# Patient Record
Sex: FEMALE | Race: WHITE | NOT HISPANIC OR LATINO | Employment: FULL TIME | URBAN - METROPOLITAN AREA
[De-identification: names, ages, dates, MRNs, and addresses within clinical notes are randomized per-mention and may not be internally consistent; named-entity substitution may affect disease eponyms.]

---

## 2024-08-08 ENCOUNTER — OFFICE VISIT (OUTPATIENT)
Age: 19
End: 2024-08-08
Payer: COMMERCIAL

## 2024-08-08 VITALS
RESPIRATION RATE: 20 BRPM | WEIGHT: 142 LBS | TEMPERATURE: 98.2 F | OXYGEN SATURATION: 98 % | HEIGHT: 63 IN | BODY MASS INDEX: 25.16 KG/M2 | HEART RATE: 105 BPM | DIASTOLIC BLOOD PRESSURE: 68 MMHG | SYSTOLIC BLOOD PRESSURE: 120 MMHG

## 2024-08-08 DIAGNOSIS — M72.2 PLANTAR FASCIITIS: ICD-10-CM

## 2024-08-08 DIAGNOSIS — J06.9 VIRAL URI WITH COUGH: ICD-10-CM

## 2024-08-08 DIAGNOSIS — Z00.00 HEALTHCARE MAINTENANCE: ICD-10-CM

## 2024-08-08 DIAGNOSIS — F41.9 ANXIETY: ICD-10-CM

## 2024-08-08 DIAGNOSIS — R05.9 COUGH, UNSPECIFIED TYPE: Primary | ICD-10-CM

## 2024-08-08 LAB
EXPIRATION DATE: NORMAL
FLUAV AG UPPER RESP QL IA.RAPID: NOT DETECTED
FLUBV AG UPPER RESP QL IA.RAPID: NOT DETECTED
INTERNAL CONTROL: NORMAL
Lab: NORMAL
SARS-COV-2 AG UPPER RESP QL IA.RAPID: NOT DETECTED

## 2024-08-08 PROCEDURE — 99204 OFFICE O/P NEW MOD 45 MIN: CPT | Performed by: STUDENT IN AN ORGANIZED HEALTH CARE EDUCATION/TRAINING PROGRAM

## 2024-08-08 PROCEDURE — 87428 SARSCOV & INF VIR A&B AG IA: CPT | Performed by: STUDENT IN AN ORGANIZED HEALTH CARE EDUCATION/TRAINING PROGRAM

## 2024-08-08 RX ORDER — BENZONATATE 100 MG/1
100 CAPSULE ORAL 3 TIMES DAILY PRN
Qty: 30 CAPSULE | Refills: 1 | Status: SHIPPED | OUTPATIENT
Start: 2024-08-08

## 2024-08-08 RX ORDER — SERTRALINE HYDROCHLORIDE 25 MG/1
25 TABLET, FILM COATED ORAL DAILY
Qty: 30 TABLET | Refills: 5 | Status: SHIPPED | OUTPATIENT
Start: 2024-08-08

## 2024-08-08 RX ORDER — NORELGESTROMIN AND ETHINYL ESTRADIOL 35; 150 UG/D; UG/D
1 PATCH TRANSDERMAL WEEKLY
COMMUNITY

## 2024-08-08 RX ORDER — PROCHLORPERAZINE MALEATE 10 MG
10 TABLET ORAL EVERY 6 HOURS PRN
COMMUNITY
Start: 2024-06-13

## 2024-08-08 RX ORDER — MELOXICAM 7.5 MG/1
7.5 TABLET ORAL DAILY
Qty: 30 TABLET | Refills: 0 | Status: SHIPPED | OUTPATIENT
Start: 2024-08-08

## 2024-08-09 NOTE — PROGRESS NOTES
Office Note     Name: Lashay Hazel    : 2005     MRN: [id number]     Chief Complaint  Establish Care, Foot Pain (L x2-3 days, started when walking in [place]/Reports arch in foot is collapsing), Anxiety (Skin picking), and Cough (No sense of smell & taste/Nasal drainage & congestion/Body aches)    Subjective     History of Present Illness:  Lashay Hazel is a 19 y.o. female who presents today for initial visit to establish care.  Her primary concerns are anxiety, foot pain, acute respiratory illness with congestion and bodyaches.    Past Medical History:   Past Medical History:   Diagnosis Date   • Ovarian cyst 2023    Per CT scan at [facility]       Past Surgical History: History reviewed. No pertinent surgical history.    Immunizations:   There is no immunization history on file for this patient.     Medications:     Current Outpatient Medications:   •  Zafemy 150-35 MCG/24HR, Place 1 patch on the skin as directed by provider 1 (One) Time Per Week. 1 patch once weekly for 3 weeks, then 1 week off. Repeat on same day every week, Disp: , Rfl:   •  benzonatate (Tessalon Perles) 100 MG capsule, Take 1 capsule by mouth 3 (Three) Times a Day As Needed for Cough., Disp: 30 capsule, Rfl: 1  •  meloxicam (MOBIC) 7.5 MG tablet, Take 1 tablet by mouth Daily., Disp: 30 tablet, Rfl: 0  •  prochlorperazine (COMPAZINE) 10 MG tablet, Take 1 tablet by mouth Every 6 (Six) Hours As Needed. (Patient not taking: Reported on 2024), Disp: , Rfl:   •  sertraline (ZOLOFT) 25 MG tablet, Take 1 tablet by mouth Daily., Disp: 30 tablet, Rfl: 5    Allergies:   No Known Allergies    Family History:   Family History   Problem Relation Age of Onset   • Hypertension Father    • Hypertension Maternal Grandmother    • Hypertension Maternal Grandfather    • Hypertension Paternal Grandmother    • Hypertension Paternal Grandfather        Social History:   Social History     Socioeconomic History   • Marital status: Single  "  Tobacco Use   • Smoking status: Never     Passive exposure: Current   • Smokeless tobacco: Never   Vaping Use   • Vaping status: Never Used   • Passive vaping exposure: Yes   Substance and Sexual Activity   • Alcohol use: Never   • Drug use: Never   • Sexual activity: Yes     Partners: Male     Birth control/protection: Patch         Objective     Vital Signs  /68 (BP Location: Right arm, Patient Position: Sitting, Cuff Size: Adult)   Pulse 105   Temp 98.2 °F (36.8 °C) (Infrared)   Resp 20   Ht 160 cm (62.99\")   Wt 64.4 kg (142 lb)   SpO2 98%   BMI 25.16 kg/m²   Estimated body mass index is 25.16 kg/m² as calculated from the following:    Height as of this encounter: 160 cm (62.99\").    Weight as of this encounter: 64.4 kg (142 lb).    BMI is >= 25 and <30. (Overweight) The following options were offered after discussion;: exercise counseling/recommendations      Physical Exam  Constitutional:       General: She is not in acute distress.     Appearance: She is not toxic-appearing.   Cardiovascular:      Rate and Rhythm: Normal rate and regular rhythm.      Heart sounds: No murmur heard.     No friction rub. No gallop.   Pulmonary:      Effort: Pulmonary effort is normal.      Breath sounds: Normal breath sounds.   Abdominal:      General: Abdomen is flat. There is no distension.   Skin:     General: Skin is warm and dry.   Neurological:      Mental Status: She is alert.   Psychiatric:         Mood and Affect: Mood normal.         Behavior: Behavior normal.          Assessment and Plan     1. Cough, unspecified type  COVID and flu negative  - POCT SARS-CoV-2 Antigen MAGALY + Flu    2. Plantar fasciitis  Chronic, uncontrolled  -Flared up from walking while in [place]  -Prescribed meloxicam, counseled on getting foot inserts and home massage and stretching (frozen water bottle or golf ball)  - meloxicam (MOBIC) 7.5 MG tablet; Take 1 tablet by mouth Daily.  Dispense: 30 tablet; Refill: 0    3. " Anxiety  Chronic, uncontrolled  -With skin picking  -Prescribed low-dose Zoloft and referred to behavioral health  - sertraline (ZOLOFT) 25 MG tablet; Take 1 tablet by mouth Daily.  Dispense: 30 tablet; Refill: 5  - Ambulatory Referral to Behavioral Health    4. Healthcare maintenance  Will defer vaccinations and screening labs at this time due to acute respiratory illness    5. Viral URI with cough  Suspect this is a viral infection, will treat symptomatically with Tessalon Perles  -Counseled her to message in if symptoms persist for 7 to 10 days and we will send in antibiotics  - benzonatate (Tessalon Perles) 100 MG capsule; Take 1 capsule by mouth 3 (Three) Times a Day As Needed for Cough.  Dispense: 30 capsule; Refill: 1       Counseling was given to patient for the following topics: instructions for management.    Follow Up  Return in about 6 weeks (around 9/19/2024) for Annual physical.    Rahat Kapadia MD  [facility]  [address] [address]  [phone]

## 2024-08-27 RX ORDER — NORELGESTROMIN AND ETHINYL ESTRADIOL 35; 150 UG/MG; UG/MG
1 PATCH TRANSDERMAL WEEKLY
Qty: 3 PATCH | Refills: 11 | Status: SHIPPED | OUTPATIENT
Start: 2024-08-27

## 2024-08-27 NOTE — TELEPHONE ENCOUNTER
Rx Refill Note  Requested Prescriptions     Pending Prescriptions Disp Refills    Zafemy 150-35 MCG/24HR 3 patch      Sig: Place 1 patch on the skin as directed by provider 1 (One) Time Per Week. 1 patch once weekly for 3 weeks, then 1 week off. Repeat on same day every week      Last office visit with prescribing clinician: 8/8/2024   Last telemedicine visit with prescribing clinician: Visit date not found   Next office visit with prescribing clinician: Visit date not found     Evelin Armstrong Rep  08/27/24, 08:23 EDT    This has not been prescribed by Dr. Kapadia

## 2024-09-04 DIAGNOSIS — M72.2 PLANTAR FASCIITIS: ICD-10-CM

## 2024-09-04 NOTE — TELEPHONE ENCOUNTER
Rx Refill Note  Requested Prescriptions     Pending Prescriptions Disp Refills    meloxicam (MOBIC) 7.5 MG tablet [Pharmacy Med Name: MELOXICAM 7.5MG TABLETS] 30 tablet 0     Sig: TAKE 1 TABLET BY MOUTH DAILY      Last office visit with prescribing clinician: 8/8/2024   Last telemedicine visit with prescribing clinician: Visit date not found   Next office visit with prescribing clinician: Visit date not found                         Would you like a call back once the refill request has been completed: [] Yes [] No    If the office needs to give you a call back, can they leave a voicemail: [] Yes [] No    Hakeem Sarabia MA  09/04/24, 09:19 EDT  PATIENT IS SEEING YOU 09/19, WASN'T SURE IF THIS LOOKED LONG TERM OR NOT

## 2024-09-09 RX ORDER — MELOXICAM 7.5 MG/1
7.5 TABLET ORAL DAILY
Qty: 30 TABLET | Refills: 0 | Status: SHIPPED | OUTPATIENT
Start: 2024-09-09

## 2024-10-31 ENCOUNTER — OFFICE VISIT (OUTPATIENT)
Age: 19
End: 2024-10-31
Payer: COMMERCIAL

## 2024-10-31 VITALS
OXYGEN SATURATION: 99 % | SYSTOLIC BLOOD PRESSURE: 124 MMHG | HEIGHT: 63 IN | HEART RATE: 91 BPM | WEIGHT: 142.5 LBS | DIASTOLIC BLOOD PRESSURE: 86 MMHG | BODY MASS INDEX: 25.25 KG/M2

## 2024-10-31 DIAGNOSIS — G43.709 CHRONIC MIGRAINE WITHOUT AURA WITHOUT STATUS MIGRAINOSUS, NOT INTRACTABLE: Primary | ICD-10-CM

## 2024-10-31 PROCEDURE — 99214 OFFICE O/P EST MOD 30 MIN: CPT | Performed by: STUDENT IN AN ORGANIZED HEALTH CARE EDUCATION/TRAINING PROGRAM

## 2024-10-31 RX ORDER — RIMEGEPANT SULFATE 75 MG/75MG
75 TABLET, ORALLY DISINTEGRATING ORAL ONCE AS NEEDED
Qty: 4 TABLET | Refills: 0 | Status: SHIPPED | OUTPATIENT
Start: 2024-10-31

## 2024-10-31 RX ORDER — SUMATRIPTAN 50 MG/1
50 TABLET, FILM COATED ORAL
Qty: 9 TABLET | Refills: 3 | Status: SHIPPED | OUTPATIENT
Start: 2024-10-31

## 2024-10-31 NOTE — PROGRESS NOTES
Office Note     Name: Lashay Hazel    : 2005     MRN: [id number]     Chief Complaint  Migraine (Once or twice a month, increasing/Ibuprofen 800mg, tylenol, aleve)    Subjective     History of Present Illness:  Lashay Hazel is a 19 y.o. female who presents today for follow-up of chronic migraines.  Reports migraines have been uncontrolled lately.  With episodes 2-3 times per month causing her to miss work and multiple other more mild episodes on more than half the days.  Denies any aura, does report photophobia, nausea, throbbing unilateral headache consistent with migraines.  She reports trying another prescription abortive medication believed to be a triptan in her childhood which stopped working.    Past Medical History:   Past Medical History:   Diagnosis Date    Anxiety     Depression     Headache     Ovarian cyst 2023    Per CT scan at [facility]       Past Surgical History: History reviewed. No pertinent surgical history.    Immunizations:   Immunization History   Administered Date(s) Administered    COVID-19 (PFIZER) Purple Cap Monovalent 2021, 09/10/2021    DTaP 5 2004, 2004, 2005, 2005, 2008    Hep A, 2 Dose 2018, 2019    Hep B / HiB 2004, 2005    Hep B, Adolescent or Pediatric 2004    Hib (PRP-OMP) 2004    Hpv9 2018, 2019    IPV 2004, 2004, 2005, 2008    MMR 2005, 2008    Meningococcal B,(Bexsero) 2021    Meningococcal Conjugate 2015, 2021    Meningococcal MCV4P (Menactra) 2021    PEDS-Pneumococcal Conjugate (PCV7) 2004, 2005, 2005    Td (TDVAX) 2005    Tdap 2015    Varicella 2005, 2008        Medications:     Current Outpatient Medications:     norelgestromin-ethinyl estradiol (Zafemy) 150-35 MCG/24HR, Place 1 patch on the skin as directed by provider 1 (One) Time Per Week. 1 patch once weekly for 3  "weeks, then 1 week off. Repeat on same day every week, Disp: 3 patch, Rfl: 11    Rimegepant Sulfate (Nurtec) 75 MG tablet dispersible tablet, Take 1 tablet by mouth 1 (One) Time As Needed (migraine. 1 tab per 24 hrs)., Disp: 4 tablet, Rfl: 0    SUMAtriptan (IMITREX) 50 MG tablet, Take 1 tablet by mouth Every 2 (Two) Hours As Needed for Migraine. Max dose 200 mg per day, Disp: 9 tablet, Rfl: 3    Allergies:   No Known Allergies    Family History:   Family History   Problem Relation Age of Onset    Hypertension Father     Depression Father     Drug abuse Father     Mental illness Father     Hypertension Maternal Grandmother     Anxiety disorder Maternal Grandmother     Arthritis Maternal Grandmother     Depression Maternal Grandmother     Mental illness Maternal Grandmother     Hypertension Maternal Grandfather     Alcohol abuse Maternal Grandfather     Hypertension Paternal Grandmother     Anxiety disorder Paternal Grandmother     Depression Paternal Grandmother     Mental illness Paternal Grandmother     Hypertension Paternal Grandfather     Heart disease Paternal Grandfather     Anxiety disorder Mother     Depression Mother     Mental illness Mother        Social History:   Social History     Socioeconomic History    Marital status:    Tobacco Use    Smoking status: Never     Passive exposure: Current    Smokeless tobacco: Never   Vaping Use    Vaping status: Never Used    Passive vaping exposure: Yes   Substance and Sexual Activity    Alcohol use: Never    Drug use: Never    Sexual activity: Yes     Partners: Male     Birth control/protection: Patch         Objective     Vital Signs  /86 (BP Location: Left arm, Patient Position: Sitting, Cuff Size: Adult)   Pulse 91   Ht 160 cm (62.99\")   Wt 64.6 kg (142 lb 8 oz)   SpO2 99%   BMI 25.25 kg/m²   Estimated body mass index is 25.25 kg/m² as calculated from the following:    Height as of this encounter: 160 cm (62.99\").    Weight as of this encounter: " 64.6 kg (142 lb 8 oz).            Physical Exam  Constitutional:       General: She is not in acute distress.     Appearance: She is not toxic-appearing.   Pulmonary:      Effort: Pulmonary effort is normal. No respiratory distress.   Abdominal:      General: Abdomen is flat. There is no distension.   Skin:     General: Skin is warm and dry.   Neurological:      Mental Status: She is alert.   Psychiatric:         Mood and Affect: Mood normal.         Behavior: Behavior normal.          Assessment and Plan     1. Chronic migraine without aura without status migrainosus, not intractable  Reasonable accommodation paperwork filled out for work, copy provided in chart  -Has failed 1 other prescription abortive, likely a triptan in the past and will try sumatriptan  -Sample of Nurtec also provided in case this does not work  -Counseled on how to take both medications, offered a preventative medication given mild headaches on more days than not in the month, patient declined.  She is not using over-the-counter analgesics at a frequency that should cause medication overuse headache  - SUMAtriptan (IMITREX) 50 MG tablet; Take 1 tablet by mouth Every 2 (Two) Hours As Needed for Migraine. Max dose 200 mg per day  Dispense: 9 tablet; Refill: 3  - Rimegepant Sulfate (Nurtec) 75 MG tablet dispersible tablet; Take 1 tablet by mouth 1 (One) Time As Needed (migraine. 1 tab per 24 hrs).  Dispense: 4 tablet; Refill: 0     A total time of 30 minutes was spent on this encounter on the day of the encounter with 50% or more of that time spent face-to-face on counseling, examination, history taking.    Counseling was given to patient for the following topics: instructions for management.    Follow Up  Return in about 3 months (around 1/31/2025) for Annual physical.    Rahat Kapadia MD  [facility]  [facility] [address] [phone]

## 2024-11-11 ENCOUNTER — PRIOR AUTHORIZATION (OUTPATIENT)
Age: 19
End: 2024-11-11
Payer: COMMERCIAL

## 2024-11-13 NOTE — TELEPHONE ENCOUNTER
romina Yasemin MEDIC (Key: [id number]) - [id number]  Nurtec 75MG dispersible tablets  status: PA RequestCreated: November 13th, 2024Sent: November 13th, 2024

## 2024-11-13 NOTE — TELEPHONE ENCOUNTER
Received letter that the PA has been canceled. Called number on determination. They gave another number to call     [phone]

## 2024-11-14 NOTE — TELEPHONE ENCOUNTER
Denied     Per your health plan's criteria, this drug is covered if you meet the following:  (1) If you have four or more headache days per month, one of the following:  (A) You are currently being treated with one preventive treatment for migraine from the following:  (I) Elavil (amitriptyline) or Effexor (venlafaxine).  (II) Depakote/Depakote ER (divalproex sodium) or Topamax (topiramate).  (III) Atenolol, metoprolol, nadolol, propranolol, or timolol.  (IV) Candesartan.  (V) Lisinopril.  (B) You have failed (after at least a two month trial) or cannot use one of the following preventive  treatments for migraine:  (I) Elavil (amitriptyline) or Effexor (venlafaxine).  (II) Depakote/Depakote ER (divalproex sodium) or Topamax (topiramate).  (III) Atenolol, metoprolol, nadolol, propranolol, or timolol.  (IV) Candesartan.

## 2024-12-07 ENCOUNTER — PATIENT MESSAGE (OUTPATIENT)
Age: 19
End: 2024-12-07
Payer: COMMERCIAL

## 2024-12-09 RX ORDER — KETOCONAZOLE 20 MG/ML
SHAMPOO, SUSPENSION TOPICAL
Qty: 120 ML | Refills: 1 | Status: SHIPPED | OUTPATIENT
Start: 2024-12-09 | End: 2025-01-06

## 2025-02-17 ENCOUNTER — TELEPHONE (OUTPATIENT)
Dept: FAMILY MEDICINE CLINIC | Facility: TELEHEALTH | Age: 20
End: 2025-02-17
Payer: COMMERCIAL

## 2025-02-17 ENCOUNTER — APPOINTMENT (OUTPATIENT)
Facility: HOSPITAL | Age: 20
End: 2025-02-17
Payer: COMMERCIAL

## 2025-02-17 ENCOUNTER — HOSPITAL ENCOUNTER (EMERGENCY)
Facility: HOSPITAL | Age: 20
Discharge: HOME | End: 2025-02-17
Attending: EMERGENCY MEDICINE | Admitting: EMERGENCY MEDICINE
Payer: COMMERCIAL

## 2025-02-17 VITALS
WEIGHT: 140 LBS | BODY MASS INDEX: 24.8 KG/M2 | OXYGEN SATURATION: 100 % | SYSTOLIC BLOOD PRESSURE: 111 MMHG | RESPIRATION RATE: 20 BRPM | HEIGHT: 63 IN | HEART RATE: 93 BPM | TEMPERATURE: 98.4 F | DIASTOLIC BLOOD PRESSURE: 67 MMHG

## 2025-02-17 DIAGNOSIS — R05.1 ACUTE COUGH: ICD-10-CM

## 2025-02-17 DIAGNOSIS — J10.1 INFLUENZA A VIRUS PRESENT: Primary | ICD-10-CM

## 2025-02-17 LAB
ALBUMIN SERPL-MCNC: 4.2 G/DL (ref 3.5–5.2)
ALBUMIN/GLOB SERPL: 1.3 G/DL
ALP SERPL-CCNC: 70 U/L (ref 39–117)
ALT SERPL W P-5'-P-CCNC: 8 U/L (ref 1–33)
ANION GAP SERPL CALCULATED.3IONS-SCNC: 16.7 MMOL/L (ref 5–15)
AST SERPL-CCNC: 20 U/L (ref 1–32)
BASOPHILS # BLD AUTO: 0.02 10*3/MM3 (ref 0–0.2)
BASOPHILS NFR BLD AUTO: 0.3 % (ref 0–1.5)
BILIRUB SERPL-MCNC: 0.3 MG/DL (ref 0–1.2)
BUN SERPL-MCNC: 10 MG/DL (ref 6–20)
BUN/CREAT SERPL: 20 (ref 7–25)
CALCIUM SPEC-SCNC: 9 MG/DL (ref 8.6–10.5)
CHLORIDE SERPL-SCNC: 97 MMOL/L (ref 98–107)
CO2 SERPL-SCNC: 21.3 MMOL/L (ref 22–29)
CREAT SERPL-MCNC: 0.5 MG/DL (ref 0.57–1)
DEPRECATED RDW RBC AUTO: 40.5 FL (ref 37–54)
EGFRCR SERPLBLD CKD-EPI 2021: 137.9 ML/MIN/1.73
EOSINOPHIL # BLD AUTO: 0 10*3/MM3 (ref 0–0.4)
EOSINOPHIL NFR BLD AUTO: 0 % (ref 0.3–6.2)
ERYTHROCYTE [DISTWIDTH] IN BLOOD BY AUTOMATED COUNT: 12.8 % (ref 12.3–15.4)
FLUAV RNA RESP QL NAA+PROBE: DETECTED
FLUBV RNA RESP QL NAA+PROBE: NOT DETECTED
GLOBULIN UR ELPH-MCNC: 3.2 GM/DL
GLUCOSE SERPL-MCNC: 88 MG/DL (ref 65–99)
HCG INTACT+B SERPL-ACNC: <0.2 MIU/ML
HCT VFR BLD AUTO: 37.2 % (ref 34–46.6)
HGB BLD-MCNC: 12.5 G/DL (ref 12–15.9)
IMM GRANULOCYTES # BLD AUTO: 0.01 10*3/MM3 (ref 0–0.05)
IMM GRANULOCYTES NFR BLD AUTO: 0.1 % (ref 0–0.5)
LYMPHOCYTES # BLD AUTO: 1.71 10*3/MM3 (ref 0.7–3.1)
LYMPHOCYTES NFR BLD AUTO: 23.8 % (ref 19.6–45.3)
MCH RBC QN AUTO: 28.6 PG (ref 26.6–33)
MCHC RBC AUTO-ENTMCNC: 33.6 G/DL (ref 31.5–35.7)
MCV RBC AUTO: 85.1 FL (ref 79–97)
MONOCYTES # BLD AUTO: 1.26 10*3/MM3 (ref 0.1–0.9)
MONOCYTES NFR BLD AUTO: 17.6 % (ref 5–12)
NEUTROPHILS NFR BLD AUTO: 4.17 10*3/MM3 (ref 1.7–7)
NEUTROPHILS NFR BLD AUTO: 58.2 % (ref 42.7–76)
PLATELET # BLD AUTO: 198 10*3/MM3 (ref 140–450)
PMV BLD AUTO: 10.9 FL (ref 6–12)
POTASSIUM SERPL-SCNC: 3.8 MMOL/L (ref 3.5–5.2)
PROT SERPL-MCNC: 7.4 G/DL (ref 6–8.5)
RBC # BLD AUTO: 4.37 10*6/MM3 (ref 3.77–5.28)
RSV RNA RESP QL NAA+PROBE: NOT DETECTED
S PYO AG THROAT QL: NEGATIVE
SARS-COV-2 RNA RESP QL NAA+PROBE: NOT DETECTED
SODIUM SERPL-SCNC: 135 MMOL/L (ref 136–145)
WBC NRBC COR # BLD AUTO: 7.17 10*3/MM3 (ref 3.4–10.8)

## 2025-02-17 PROCEDURE — 99283 EMERGENCY DEPT VISIT LOW MDM: CPT

## 2025-02-17 PROCEDURE — 36415 COLL VENOUS BLD VENIPUNCTURE: CPT

## 2025-02-17 PROCEDURE — 25010000002 KETOROLAC TROMETHAMINE PER 15 MG: Performed by: PHYSICIAN ASSISTANT

## 2025-02-17 PROCEDURE — 80053 COMPREHEN METABOLIC PANEL: CPT | Performed by: PHYSICIAN ASSISTANT

## 2025-02-17 PROCEDURE — 87081 CULTURE SCREEN ONLY: CPT | Performed by: PHYSICIAN ASSISTANT

## 2025-02-17 PROCEDURE — 87637 SARSCOV2&INF A&B&RSV AMP PRB: CPT | Performed by: PHYSICIAN ASSISTANT

## 2025-02-17 PROCEDURE — 96374 THER/PROPH/DIAG INJ IV PUSH: CPT

## 2025-02-17 PROCEDURE — 84702 CHORIONIC GONADOTROPIN TEST: CPT | Performed by: PHYSICIAN ASSISTANT

## 2025-02-17 PROCEDURE — 87880 STREP A ASSAY W/OPTIC: CPT | Performed by: PHYSICIAN ASSISTANT

## 2025-02-17 PROCEDURE — 25810000003 LACTATED RINGERS SOLUTION: Performed by: PHYSICIAN ASSISTANT

## 2025-02-17 PROCEDURE — 71045 X-RAY EXAM CHEST 1 VIEW: CPT

## 2025-02-17 PROCEDURE — 85025 COMPLETE CBC W/AUTO DIFF WBC: CPT | Performed by: PHYSICIAN ASSISTANT

## 2025-02-17 RX ORDER — BENZONATATE 100 MG/1
100 CAPSULE ORAL 3 TIMES DAILY PRN
Qty: 20 CAPSULE | Refills: 0 | Status: SHIPPED | OUTPATIENT
Start: 2025-02-17

## 2025-02-17 RX ORDER — KETOROLAC TROMETHAMINE 30 MG/ML
30 INJECTION, SOLUTION INTRAMUSCULAR; INTRAVENOUS ONCE
Status: COMPLETED | OUTPATIENT
Start: 2025-02-17 | End: 2025-02-17

## 2025-02-17 RX ORDER — ONDANSETRON 4 MG/1
4 TABLET, ORALLY DISINTEGRATING ORAL EVERY 4 HOURS
Qty: 20 TABLET | Refills: 0 | Status: SHIPPED | OUTPATIENT
Start: 2025-02-17

## 2025-02-17 RX ORDER — SODIUM CHLORIDE 0.9 % (FLUSH) 0.9 %
10 SYRINGE (ML) INJECTION AS NEEDED
Status: DISCONTINUED | OUTPATIENT
Start: 2025-02-17 | End: 2025-02-17 | Stop reason: HOSPADM

## 2025-02-17 RX ADMIN — SODIUM CHLORIDE, POTASSIUM CHLORIDE, SODIUM LACTATE AND CALCIUM CHLORIDE 1000 ML: 600; 310; 30; 20 INJECTION, SOLUTION INTRAVENOUS at 18:38

## 2025-02-17 RX ADMIN — KETOROLAC TROMETHAMINE 30 MG: 30 INJECTION, SOLUTION INTRAMUSCULAR; INTRAVENOUS at 18:37

## 2025-02-17 NOTE — Clinical Note
[facility] EMERGENCY DEPARTMENT [place]  [address]   [facility] [address]  Phone: [phone]  Fax: [phone]    Lashay Hazle was seen and treated in our emergency department on 2/17/2025.  She may return to work on 02/20/2025.         Thank you for choosing [facility].    Saniya Menjivar PA

## 2025-02-18 NOTE — FSED PROVIDER NOTE
Subjective   History of Present Illness  Patient presents to ER with history of cough, fever, body aches, and nausea.  She states her symptoms began 2 days ago and worsened today.  She has history of migraines and reports she took her sumatriptan for migraine yesterday.  She still complains of headache today.  Denies known sick contacts.  She denies underlying lung or heart disease.  She does not smoke.  She endorses nausea but denies vomiting and diarrhea.  She states she has not been able to eat or drink much due to decreased appetite and states she feels weak.        Review of Systems   Constitutional:  Positive for appetite change, chills, fatigue and fever.   Respiratory:  Positive for cough.    Gastrointestinal:  Positive for nausea.   Musculoskeletal:  Positive for arthralgias and myalgias.   Neurological:  Positive for headaches.   All other systems reviewed and are negative.      Past Medical History:   Diagnosis Date    Anxiety     Depression     Headache     Ovarian cyst 09/13/2023    Per CT scan at [facility]       No Known Allergies    History reviewed. No pertinent surgical history.    Family History   Problem Relation Age of Onset    Hypertension Father     Depression Father     Drug abuse Father     Mental illness Father     Hypertension Maternal Grandmother     Anxiety disorder Maternal Grandmother     Arthritis Maternal Grandmother     Depression Maternal Grandmother     Mental illness Maternal Grandmother     Hypertension Maternal Grandfather     Alcohol abuse Maternal Grandfather     Hypertension Paternal Grandmother     Anxiety disorder Paternal Grandmother     Depression Paternal Grandmother     Mental illness Paternal Grandmother     Hypertension Paternal Grandfather     Heart disease Paternal Grandfather     Anxiety disorder Mother     Depression Mother     Mental illness Mother        Social History     Socioeconomic History    Marital status:    Tobacco Use    Smoking status: Never      Passive exposure: Current    Smokeless tobacco: Never   Vaping Use    Vaping status: Never Used    Passive vaping exposure: Yes   Substance and Sexual Activity    Alcohol use: Never    Drug use: Never    Sexual activity: Yes     Partners: Male     Birth control/protection: Patch           Objective   Physical Exam  Vitals and nursing note reviewed.   Constitutional:       Appearance: Normal appearance. She is normal weight. She is ill-appearing.   HENT:      Head: Normocephalic and atraumatic.      Right Ear: Tympanic membrane and external ear normal.      Left Ear: Tympanic membrane and external ear normal.      Nose: Nose normal.      Mouth/Throat:      Mouth: Mucous membranes are moist.      Pharynx: Oropharynx is clear. Posterior oropharyngeal erythema present.   Eyes:      Extraocular Movements: Extraocular movements intact.      Pupils: Pupils are equal, round, and reactive to light.   Cardiovascular:      Rate and Rhythm: Regular rhythm. Tachycardia present.      Pulses: Normal pulses.   Pulmonary:      Effort: Pulmonary effort is normal. No respiratory distress.      Breath sounds: Normal breath sounds. No stridor. No wheezing.   Abdominal:      General: Abdomen is flat. Bowel sounds are normal.      Palpations: There is no mass.      Tenderness: There is no abdominal tenderness. There is no guarding or rebound. Negative signs include Rodriguez's sign, Rovsing's sign, McBurney's sign and psoas sign.      Hernia: No hernia is present.   Musculoskeletal:         General: Normal range of motion.      Cervical back: Normal range of motion.   Skin:     General: Skin is warm and dry.      Capillary Refill: Capillary refill takes less than 2 seconds.      Coloration: Skin is not jaundiced or pale.      Findings: No erythema.   Neurological:      General: No focal deficit present.      Mental Status: She is alert.   Psychiatric:         Mood and Affect: Mood normal.         Behavior: Behavior normal.          Procedures           ED Course                                           Medical Decision Making  Differential diagnosis includes influenza, viral syndrome, pneumonia, migraine, bacterial infection    Problems Addressed:  Acute cough: complicated acute illness or injury  Influenza A virus present: complicated acute illness or injury    Amount and/or Complexity of Data Reviewed  Labs: ordered.  Radiology: ordered.    Risk  Prescription drug management.        Final diagnoses:   Influenza A virus present   Acute cough       ED Disposition  ED Disposition       ED Disposition   Discharge    Condition   Stable    Comment   --               Alec Kapadia MD  [address]  [facility] [address]  [place] [id number] [phone]      As needed         Medication List        New Prescriptions      benzonatate 100 MG capsule  Commonly known as: TESSALON  Take 1 capsule by mouth 3 (Three) Times a Day As Needed for Cough.     ondansetron ODT 4 MG disintegrating tablet  Commonly known as: ZOFRAN-ODT  Take 1 tablet by mouth Every 4 (Four) Hours.               Where to Get Your Medications        These medications were sent to [id number] - [place], [place] - [address] [facility] [address] [facility] [phone] [facility] [phone]   [address] [address]      Phone: [phone]   benzonatate 100 MG capsule  ondansetron ODT 4 MG disintegrating tablet

## 2025-02-19 LAB — BACTERIA SPEC AEROBE CULT: NORMAL

## 2025-03-03 DIAGNOSIS — G43.709 CHRONIC MIGRAINE WITHOUT AURA WITHOUT STATUS MIGRAINOSUS, NOT INTRACTABLE: ICD-10-CM

## 2025-03-04 NOTE — TELEPHONE ENCOUNTER
Rx Refill Note  Requested Prescriptions     Pending Prescriptions Disp Refills    rimegepant sulfate ODT (Nurtec) 75 MG disintegrating tablet 4 tablet 0     Si tablet 1 (One) Time As Needed (migraine. 1 tab per 24 hrs).      Last office visit with prescribing clinician: 10/31/2024   Last telemedicine visit with prescribing clinician: Visit date not found   Next office visit with prescribing clinician: Visit date not found     Nidia Wick MA  25, 12:44 EST    RELAY  Pt due for physical

## 2025-03-04 NOTE — TELEPHONE ENCOUNTER
Rx Refill Note  Requested Prescriptions     Pending Prescriptions Disp Refills    norelgestromin-ethinyl estradiol (Zafemy) 150-35 MCG/24HR 3 patch 11     Sig: Place 1 patch on the skin as directed by provider 1 (One) Time Per Week. 1 patch once weekly for 3 weeks, then 1 week off. Repeat on same day every week      Last office visit with prescribing clinician: Visit date not found   Last telemedicine visit with prescribing clinician: Visit date not found   Next office visit with prescribing clinician: Visit date not found     Nidia Wick MA  03/04/25, 12:46 EST    RELAY  Pt is due for an apt, sent a message through my chart over a separate encounter.

## 2025-03-05 RX ORDER — NORELGESTROMIN AND ETHINYL ESTRADIOL 35; 150 UG/MG; UG/MG
1 PATCH TRANSDERMAL WEEKLY
Qty: 3 PATCH | Refills: 11 | OUTPATIENT
Start: 2025-03-05

## 2025-03-07 ENCOUNTER — OFFICE VISIT (OUTPATIENT)
Age: 20
End: 2025-03-07
Payer: COMMERCIAL

## 2025-03-07 VITALS
OXYGEN SATURATION: 98 % | WEIGHT: 151.9 LBS | SYSTOLIC BLOOD PRESSURE: 112 MMHG | HEIGHT: 63 IN | BODY MASS INDEX: 26.91 KG/M2 | HEART RATE: 83 BPM | DIASTOLIC BLOOD PRESSURE: 62 MMHG

## 2025-03-07 DIAGNOSIS — G43.709 CHRONIC MIGRAINE WITHOUT AURA WITHOUT STATUS MIGRAINOSUS, NOT INTRACTABLE: ICD-10-CM

## 2025-03-07 DIAGNOSIS — L21.9 SEBORRHEIC DERMATITIS: ICD-10-CM

## 2025-03-07 DIAGNOSIS — Z11.3 SCREENING EXAMINATION FOR STI: ICD-10-CM

## 2025-03-07 DIAGNOSIS — Z30.45 ENCOUNTER FOR SURVEILLANCE OF TRANSDERMAL PATCH HORMONAL CONTRACEPTIVE DEVICE: ICD-10-CM

## 2025-03-07 DIAGNOSIS — Z00.00 HEALTHCARE MAINTENANCE: Primary | ICD-10-CM

## 2025-03-07 RX ORDER — NORELGESTROMIN AND ETHINYL ESTRADIOL 35; 150 UG/MG; UG/MG
1 PATCH TRANSDERMAL WEEKLY
Qty: 3 PATCH | Refills: 11 | Status: SHIPPED | OUTPATIENT
Start: 2025-03-07

## 2025-03-07 RX ORDER — FLUOCINONIDE TOPICAL SOLUTION USP, 0.05% 0.5 MG/ML
SOLUTION TOPICAL 2 TIMES DAILY
Qty: 20 ML | Refills: 2 | Status: SHIPPED | OUTPATIENT
Start: 2025-03-07

## 2025-03-07 RX ORDER — KETOCONAZOLE 20 MG/ML
SHAMPOO, SUSPENSION TOPICAL
Qty: 120 ML | Refills: 5 | Status: SHIPPED | OUTPATIENT
Start: 2025-03-07 | End: 2025-04-04

## 2025-03-07 NOTE — PROGRESS NOTES
Office Note     Name: Lashay Hazel    : 2005     MRN: [id number]     Chief Complaint  Annual Exam    Subjective     History of Present Illness:  Lashay Hazel is a 19 y.o. female who presents today for annual health maintenance examination.  She has been doing fairly well overall although she reports her seborrheic dermatitis is flaring back up again.  She noted some benefit with the ketoconazole shampoo at first but is now having symptoms recur.  She noted that the Nurtec is helping her migraines very well.  She has moved apartments to  with less mold issues and feels this is also helped her migraines.  She needs a refill of her birth control patch.    Past Medical History:   Past Medical History:   Diagnosis Date    Anxiety     Depression     Headache     Ovarian cyst 2023    Per CT scan at [facility]       Past Surgical History: History reviewed. No pertinent surgical history.    Immunizations:   Immunization History   Administered Date(s) Administered    COVID-19 (PFIZER) Purple Cap Monovalent 2021, 09/10/2021    DTaP 5 2004, 2004, 2005, 2005, 2008    Hep A, 2 Dose 2018, 2019    Hep B / HiB 2004, 2005    Hep B, Adolescent or Pediatric 2004    Hib (PRP-OMP) 2004    Hpv9 2018, 2019    IPV 2004, 2004, 2005, 2008    MMR 2005, 2008    Meningococcal B,(Bexsero) 2021    Meningococcal Conjugate 2015, 2021    Meningococcal MCV4P (Menactra) 2021    PEDS-Pneumococcal Conjugate (PCV7) 2004, 2005, 2005    Td (TDVAX) 2005    Tdap 2015    Varicella 2005, 2008        Medications:     Current Outpatient Medications:     norelgestromin-ethinyl estradiol (Zafemy) 150-35 MCG/24HR, Place 1 patch on the skin as directed by provider 1 (One) Time Per Week. 1 patch once weekly for 3 weeks, then 1 week off. Repeat on same day every  week, Disp: 3 patch, Rfl: 11    rimegepant sulfate ODT (Nurtec) 75 MG disintegrating tablet, Place 1 tablet under the tongue 1 (One) Time As Needed (migraine. 1 tab per 24 hrs)., Disp: 8 tablet, Rfl: 11    benzonatate (TESSALON) 100 MG capsule, Take 1 capsule by mouth 3 (Three) Times a Day As Needed for Cough., Disp: 20 capsule, Rfl: 0    fluocinonide (LIDEX) 0.05 % external solution, Apply  topically to the appropriate area as directed 2 (Two) Times a Day. Do not use for longer than 4 weeks at a time, Disp: 20 mL, Rfl: 2    ketoconazole (NIZORAL) 2 % shampoo, Apply 1 Application topically to the appropriate area as directed Daily for 7 days, THEN 1 Application 1 (One) Time Per Week for 21 days., Disp: 120 mL, Rfl: 5    ondansetron ODT (ZOFRAN-ODT) 4 MG disintegrating tablet, Take 1 tablet by mouth Every 4 (Four) Hours. (Patient not taking: Reported on 3/7/2025), Disp: 20 tablet, Rfl: 0    Allergies:   No Known Allergies    Family History:   Family History   Problem Relation Age of Onset    Hypertension Father     Depression Father     Drug abuse Father     Mental illness Father     Hypertension Maternal Grandmother     Anxiety disorder Maternal Grandmother     Arthritis Maternal Grandmother     Depression Maternal Grandmother     Mental illness Maternal Grandmother     Hypertension Maternal Grandfather     Alcohol abuse Maternal Grandfather     Hypertension Paternal Grandmother     Anxiety disorder Paternal Grandmother     Depression Paternal Grandmother     Mental illness Paternal Grandmother     Hypertension Paternal Grandfather     Heart disease Paternal Grandfather     Anxiety disorder Mother     Depression Mother     Mental illness Mother        Social History:   Social History     Socioeconomic History    Marital status:    Tobacco Use    Smoking status: Never     Passive exposure: Current    Smokeless tobacco: Never   Vaping Use    Vaping status: Never Used    Passive vaping exposure: Yes   Substance  "and Sexual Activity    Alcohol use: Never    Drug use: Never    Sexual activity: Yes     Partners: Male     Birth control/protection: Patch         Objective     Vital Signs  /62 (BP Location: Left arm, Patient Position: Sitting, Cuff Size: Adult)   Pulse 83   Ht 160 cm (62.99\")   Wt 68.9 kg (151 lb 14.4 oz)   SpO2 98%   BMI 26.91 kg/m²   Estimated body mass index is 26.91 kg/m² as calculated from the following:    Height as of this encounter: 160 cm (62.99\").    Weight as of this encounter: 68.9 kg (151 lb 14.4 oz).            Physical Exam  Constitutional:       General: She is not in acute distress.     Appearance: She is not toxic-appearing.   Cardiovascular:      Rate and Rhythm: Normal rate and regular rhythm.      Heart sounds: No murmur heard.     No friction rub. No gallop.   Pulmonary:      Effort: Pulmonary effort is normal.      Breath sounds: Normal breath sounds.   Abdominal:      General: Abdomen is flat. There is no distension.   Skin:     General: Skin is warm and dry.   Neurological:      Mental Status: She is alert.   Psychiatric:         Mood and Affect: Mood normal.         Behavior: Behavior normal.          Assessment and Plan     1. Chronic migraine without aura without status migrainosus, not intractable  Chronic stable  Rx nurtec  Patient has failed at least 3 abortive therapies with 2 triptans and NSAIDS  -She had good benefit from a sample of nurtec. This is used for abortive therapy  - rimegepant sulfate ODT (Nurtec) 75 MG disintegrating tablet; Place 1 tablet under the tongue 1 (One) Time As Needed (migraine. 1 tab per 24 hrs).  Dispense: 8 tablet; Refill: 11    2. Screening examination for STI  Check STI urine  - Chlamydia trachomatis, Neisseria gonorrhoeae, Trichomonas vaginalis, PCR - Urine, Urine, Clean Catch; Future  - Chlamydia trachomatis, Neisseria gonorrhoeae, Trichomonas vaginalis, PCR - Urine, Urine, Clean Catch    3. Seborrheic dermatitis  Chronic uncontrolled  Rx " ketoconazole shampoo and fluocinonide  Consider compounded treatment or non-azole antifungal if not working     4. Encounter for surveillance of transdermal patch hormonal contraceptive device  Refilled patch    5. Healthcare maintenance  #HCM  Cancer screening  -Colon Cancer: NI  -Lung Cancer: LDCT Not indicated  -Breast Cancer: Not yet indicated  -Cervical Cancer: Not yet due  Metabolic Screening  -Lipids, metabolic panel and A1c Up to date  -DEXA scan due @ 65  Depression  -PHQ-2 Depression Screening  Little interest or pleasure in doing things? Several days   Feeling down, depressed, or hopeless? Several days   PHQ-2 Total Score 2      Infections  -Chlamydia, gonorrhea, today  Immunizations  Immunization History   Administered Date(s) Administered    COVID-19 (PFIZER) Purple Cap Monovalent 08/17/2021, 09/10/2021    DTaP 5 2004, 2004, 02/21/2005, 09/20/2005, 06/25/2008    Hep A, 2 Dose 06/04/2018, 08/26/2019    Hep B / HiB 2004, 09/20/2005    Hep B, Adolescent or Pediatric 2004    Hib (PRP-OMP) 2004    Hpv9 06/04/2018, 08/26/2019    IPV 2004, 2004, 09/20/2005, 06/25/2008    MMR 06/23/2005, 06/25/2008    Meningococcal B,(Bexsero) 06/18/2021    Meningococcal Conjugate 07/24/2015, 11/12/2021    Meningococcal MCV4P (Menactra) 06/18/2021    PEDS-Pneumococcal Conjugate (PCV7) 2004, 01/07/2005, 09/20/2005    Td (TDVAX) 06/23/2005    Tdap 07/24/2015    Varicella 06/23/2005, 06/25/2008           Counseling was given to patient for the following topics: instructions for management.    Follow Up  Return in about 1 year (around 3/7/2026) for Annual physical.    Rahat Kapadia MD  [facility]  [address] [address]  [phone]

## 2025-03-10 ENCOUNTER — PRIOR AUTHORIZATION (OUTPATIENT)
Age: 20
End: 2025-03-10
Payer: COMMERCIAL

## 2025-03-10 LAB
C TRACH RRNA SPEC QL NAA+PROBE: NEGATIVE
N GONORRHOEA RRNA SPEC QL NAA+PROBE: NEGATIVE
T VAGINALIS RRNA SPEC QL NAA+PROBE: NEGATIVE

## 2025-03-10 NOTE — TELEPHONE ENCOUNTER
Unable to locate in Covermymeds.  -Faxed all info to [employer] @  [phone]  for [facility] approval.  PH #  [phone]    3/10/25

## 2025-03-13 NOTE — TELEPHONE ENCOUNTER
Nurtec was denied by plan due to drug coverage policy.     -Faxed office notes to [employer] @ [phone]  PH: [phone]    Member ID: [id number]  Case ID: [id number]  3/13/25

## 2025-07-01 ENCOUNTER — HOSPITAL ENCOUNTER (EMERGENCY)
Facility: HOSPITAL | Age: 20
Discharge: HOME | End: 2025-07-01
Attending: EMERGENCY MEDICINE | Admitting: EMERGENCY MEDICINE
Payer: MEDICAID

## 2025-07-01 ENCOUNTER — APPOINTMENT (OUTPATIENT)
Dept: CT IMAGING | Facility: HOSPITAL | Age: 20
End: 2025-07-01
Payer: MEDICAID

## 2025-07-01 VITALS
RESPIRATION RATE: 18 BRPM | OXYGEN SATURATION: 93 % | BODY MASS INDEX: 22.2 KG/M2 | HEART RATE: 56 BPM | WEIGHT: 130 LBS | TEMPERATURE: 98.1 F | DIASTOLIC BLOOD PRESSURE: 57 MMHG | HEIGHT: 64 IN | SYSTOLIC BLOOD PRESSURE: 105 MMHG

## 2025-07-01 DIAGNOSIS — R07.9 CHEST PAIN, UNSPECIFIED TYPE: Primary | ICD-10-CM

## 2025-07-01 DIAGNOSIS — E87.1 HYPONATREMIA: ICD-10-CM

## 2025-07-01 LAB
ALBUMIN SERPL-MCNC: 4.3 G/DL (ref 3.5–5.2)
ALBUMIN/GLOB SERPL: 1.2 G/DL
ALP SERPL-CCNC: 79 U/L (ref 39–117)
ALT SERPL W P-5'-P-CCNC: 10 U/L (ref 1–33)
ANION GAP SERPL CALCULATED.3IONS-SCNC: 14.1 MMOL/L (ref 5–15)
AST SERPL-CCNC: 18 U/L (ref 1–32)
BASOPHILS # BLD AUTO: 0.06 10*3/MM3 (ref 0–0.2)
BASOPHILS NFR BLD AUTO: 0.6 % (ref 0–1.5)
BILIRUB SERPL-MCNC: 0.3 MG/DL (ref 0–1.2)
BUN SERPL-MCNC: 11.2 MG/DL (ref 6–20)
BUN/CREAT SERPL: 23.3 (ref 7–25)
CALCIUM SPEC-SCNC: 9.7 MG/DL (ref 8.6–10.5)
CHLORIDE SERPL-SCNC: 102 MMOL/L (ref 98–107)
CO2 SERPL-SCNC: 16.9 MMOL/L (ref 22–29)
CREAT SERPL-MCNC: 0.48 MG/DL (ref 0.57–1)
DEPRECATED RDW RBC AUTO: 38.5 FL (ref 37–54)
EGFRCR SERPLBLD CKD-EPI 2021: 138.4 ML/MIN/1.73
EOSINOPHIL # BLD AUTO: 0.13 10*3/MM3 (ref 0–0.4)
EOSINOPHIL NFR BLD AUTO: 1.2 % (ref 0.3–6.2)
ERYTHROCYTE [DISTWIDTH] IN BLOOD BY AUTOMATED COUNT: 12.4 % (ref 12.3–15.4)
GEN 5 1HR TROPONIN T REFLEX: <6 NG/L
GLOBULIN UR ELPH-MCNC: 3.6 GM/DL
GLUCOSE SERPL-MCNC: 90 MG/DL (ref 65–99)
HCG INTACT+B SERPL-ACNC: <1 MIU/ML
HCT VFR BLD AUTO: 40.6 % (ref 34–46.6)
HGB BLD-MCNC: 13.5 G/DL (ref 12–15.9)
HOLD SPECIMEN: NORMAL
IMM GRANULOCYTES # BLD AUTO: 0.02 10*3/MM3 (ref 0–0.05)
IMM GRANULOCYTES NFR BLD AUTO: 0.2 % (ref 0–0.5)
LIPASE SERPL-CCNC: 21 U/L (ref 13–60)
LYMPHOCYTES # BLD AUTO: 3.84 10*3/MM3 (ref 0.7–3.1)
LYMPHOCYTES NFR BLD AUTO: 36.3 % (ref 19.6–45.3)
MAGNESIUM SERPL-MCNC: 1.8 MG/DL (ref 1.6–2.6)
MCH RBC QN AUTO: 28.3 PG (ref 26.6–33)
MCHC RBC AUTO-ENTMCNC: 33.3 G/DL (ref 31.5–35.7)
MCV RBC AUTO: 85.1 FL (ref 79–97)
MONOCYTES # BLD AUTO: 0.73 10*3/MM3 (ref 0.1–0.9)
MONOCYTES NFR BLD AUTO: 6.9 % (ref 5–12)
NEUTROPHILS NFR BLD AUTO: 5.79 10*3/MM3 (ref 1.7–7)
NEUTROPHILS NFR BLD AUTO: 54.8 % (ref 42.7–76)
NRBC BLD AUTO-RTO: 0 /100 WBC (ref 0–0.2)
NT-PROBNP SERPL-MCNC: 145 PG/ML (ref 0–450)
PLATELET # BLD AUTO: 340 10*3/MM3 (ref 140–450)
PMV BLD AUTO: 10.8 FL (ref 6–12)
POTASSIUM SERPL-SCNC: 3.7 MMOL/L (ref 3.5–5.2)
PROT SERPL-MCNC: 7.9 G/DL (ref 6–8.5)
QT INTERVAL: 390 MS
QTC INTERVAL: 460 MS
RBC # BLD AUTO: 4.77 10*6/MM3 (ref 3.77–5.28)
SODIUM SERPL-SCNC: 133 MMOL/L (ref 136–145)
TROPONIN T NUMERIC DELTA: NORMAL
TROPONIN T SERPL HS-MCNC: <6 NG/L
WBC NRBC COR # BLD AUTO: 10.57 10*3/MM3 (ref 3.4–10.8)
WHOLE BLOOD HOLD COAG: NORMAL
WHOLE BLOOD HOLD SPECIMEN: NORMAL

## 2025-07-01 PROCEDURE — 84702 CHORIONIC GONADOTROPIN TEST: CPT | Performed by: EMERGENCY MEDICINE

## 2025-07-01 PROCEDURE — 99285 EMERGENCY DEPT VISIT HI MDM: CPT

## 2025-07-01 PROCEDURE — 96374 THER/PROPH/DIAG INJ IV PUSH: CPT

## 2025-07-01 PROCEDURE — 25010000002 ONDANSETRON PER 1 MG: Performed by: EMERGENCY MEDICINE

## 2025-07-01 PROCEDURE — 93005 ELECTROCARDIOGRAM TRACING: CPT | Performed by: EMERGENCY MEDICINE

## 2025-07-01 PROCEDURE — 71275 CT ANGIOGRAPHY CHEST: CPT

## 2025-07-01 PROCEDURE — 36415 COLL VENOUS BLD VENIPUNCTURE: CPT

## 2025-07-01 PROCEDURE — 83880 ASSAY OF NATRIURETIC PEPTIDE: CPT | Performed by: EMERGENCY MEDICINE

## 2025-07-01 PROCEDURE — 83690 ASSAY OF LIPASE: CPT | Performed by: EMERGENCY MEDICINE

## 2025-07-01 PROCEDURE — 80053 COMPREHEN METABOLIC PANEL: CPT | Performed by: EMERGENCY MEDICINE

## 2025-07-01 PROCEDURE — 96375 TX/PRO/DX INJ NEW DRUG ADDON: CPT

## 2025-07-01 PROCEDURE — 25010000002 MORPHINE PER 10 MG: Performed by: EMERGENCY MEDICINE

## 2025-07-01 PROCEDURE — 83735 ASSAY OF MAGNESIUM: CPT | Performed by: EMERGENCY MEDICINE

## 2025-07-01 PROCEDURE — 85025 COMPLETE CBC W/AUTO DIFF WBC: CPT | Performed by: EMERGENCY MEDICINE

## 2025-07-01 PROCEDURE — 84484 ASSAY OF TROPONIN QUANT: CPT | Performed by: EMERGENCY MEDICINE

## 2025-07-01 PROCEDURE — 25510000001 IOPAMIDOL PER 1 ML: Performed by: EMERGENCY MEDICINE

## 2025-07-01 RX ORDER — MORPHINE SULFATE 4 MG/ML
4 INJECTION, SOLUTION INTRAMUSCULAR; INTRAVENOUS ONCE
Status: COMPLETED | OUTPATIENT
Start: 2025-07-01 | End: 2025-07-01

## 2025-07-01 RX ORDER — SODIUM CHLORIDE 0.9 % (FLUSH) 0.9 %
10 SYRINGE (ML) INJECTION AS NEEDED
Status: DISCONTINUED | OUTPATIENT
Start: 2025-07-01 | End: 2025-07-01 | Stop reason: HOSPADM

## 2025-07-01 RX ORDER — ASPIRIN 81 MG/1
324 TABLET, CHEWABLE ORAL ONCE
Status: COMPLETED | OUTPATIENT
Start: 2025-07-01 | End: 2025-07-01

## 2025-07-01 RX ORDER — IOPAMIDOL 755 MG/ML
100 INJECTION, SOLUTION INTRAVASCULAR
Status: COMPLETED | OUTPATIENT
Start: 2025-07-01 | End: 2025-07-01

## 2025-07-01 RX ORDER — ONDANSETRON 2 MG/ML
4 INJECTION INTRAMUSCULAR; INTRAVENOUS ONCE
Status: COMPLETED | OUTPATIENT
Start: 2025-07-01 | End: 2025-07-01

## 2025-07-01 RX ADMIN — ONDANSETRON 4 MG: 2 INJECTION INTRAMUSCULAR; INTRAVENOUS at 12:44

## 2025-07-01 RX ADMIN — MORPHINE SULFATE 4 MG: 4 INJECTION INTRAVENOUS at 12:44

## 2025-07-01 RX ADMIN — ASPIRIN 324 MG: 81 TABLET, CHEWABLE ORAL at 12:43

## 2025-07-01 RX ADMIN — IOPAMIDOL 90 ML: 755 INJECTION, SOLUTION INTRAVENOUS at 14:33

## 2025-07-01 NOTE — ED PROVIDER NOTES
Subjective   History of Present Illness  20-year-old female presents via EMS to be evaluated for chest pain.  The patient tells me that she was driving this morning around 9:40 AM when she began experiencing central chest pain that radiated to her back between her shoulder blades.  The pain was 9 out of 10 in severity at its worst and is now 7 out of 10 in severity.  She denies any accompanying vomiting.  No diaphoresis.  Given her persistent symptoms she called EMS and was brought here for further evaluation and treatment.  Of note, the patient's only risk factor for coronary artery disease is positive family history.      Review of Systems   Cardiovascular:  Positive for chest pain.   All other systems reviewed and are negative.      Past Medical History:   Diagnosis Date    Anxiety     Depression     Headache     Ovarian cyst 09/13/2023    Per CT scan at [facility]       No Known Allergies    No past surgical history on file.    Family History   Problem Relation Age of Onset    Hypertension Father     Depression Father     Drug abuse Father     Mental illness Father     Hypertension Maternal Grandmother     Anxiety disorder Maternal Grandmother     Arthritis Maternal Grandmother     Depression Maternal Grandmother     Mental illness Maternal Grandmother     Hypertension Maternal Grandfather     Alcohol abuse Maternal Grandfather     Hypertension Paternal Grandmother     Anxiety disorder Paternal Grandmother     Depression Paternal Grandmother     Mental illness Paternal Grandmother     Hypertension Paternal Grandfather     Heart disease Paternal Grandfather     Anxiety disorder Mother     Depression Mother     Mental illness Mother        Social History     Socioeconomic History    Marital status:    Tobacco Use    Smoking status: Never     Passive exposure: Current    Smokeless tobacco: Never   Vaping Use    Vaping status: Never Used    Passive vaping exposure: Yes   Substance and Sexual Activity    Alcohol  use: Never    Drug use: Never    Sexual activity: Yes     Partners: Male     Birth control/protection: Patch           Objective   Physical Exam  Vitals and nursing note reviewed.   Constitutional:       Appearance: She is well-developed. She is not diaphoretic.      Comments: Nontoxic-appearing female   HENT:      Head: Normocephalic and atraumatic.   Eyes:      Pupils: Pupils are equal, round, and reactive to light.   Cardiovascular:      Rate and Rhythm: Normal rate and regular rhythm.      Heart sounds: Normal heart sounds. No murmur heard.     No friction rub. No gallop.   Pulmonary:      Effort: Pulmonary effort is normal. No respiratory distress.      Breath sounds: Normal breath sounds. No wheezing or rales.   Abdominal:      General: Bowel sounds are normal. There is no distension.      Palpations: Abdomen is soft. There is no mass.      Tenderness: There is no abdominal tenderness. There is no guarding or rebound.      Comments: No focal abdominal tenderness, no peritoneal signs, no pain out of proportion to exam   Musculoskeletal:         General: Normal range of motion.      Cervical back: Neck supple.   Skin:     General: Skin is warm and dry.      Findings: No erythema or rash.   Neurological:      Mental Status: She is alert and oriented to person, place, and time.   Psychiatric:         Mood and Affect: Mood is anxious.         Thought Content: Thought content normal.         Judgment: Judgment normal.      Comments: Appears mildly anxious         Procedures           ED Course  ED Course as of 07/01/25 1839 Tue Jul 01, 2025   1223 20-year-old female presents via EMS to be evaluated for chest pain.  She tells me that she was driving this morning around 9:40 AM when she began experiencing central chest pain that radiated to her back between her shoulder blades.  Her pain was 9 out of 10 in severity at its worst and is now 7 out of 10 in severity.  No accompanying vomiting.  No diaphoresis.  Given  her persistent symptoms, she called EMS and was brought here for further evaluation and treatment.  On arrival, the patient is nontoxic-appearing.  Nonsurgical abdomen.  No pulse deficits noted.  No dermatomal rash present.  Her only risk factor for coronary artery disease is positive family history.  Differential diagnosis is quite broad.  We will obtain labs and imaging, and we will reassess following initial interventions. [DD]   1229 Initial EKG interpreted independently by me revealed normal sinus rhythm with a heart rate of 84 and T wave inversion noted to inferior leads. [DD]   1325 Labs interpreted independently by me are bland/unrevealing.  Initial high-sensitivity troponin is negative. [DD]   1325 HEART score of 1. [DD]   1445 Upon reevaluation, the patient looks and feels much improved.  Repeat troponin/EKG negative/unchanged.  Doubt ACS, PE, dissection, or emergent cardiothoracic process at this time based on exam, history, clinical presentation, gestalt, objective findings in the ED, and risk stratification.  HEART score of 1.  The patient will follow up with the chest pain clinic within the next 72 hours for further outpatient work-up and evaluation.  Agreeable with plan and given appropriate strict return precautions.     [DD]   1531 I personally and independently reviewed the patient's CT images and findings, and I am in agreement with the radiologist regarding CT interpretation--particularly there is no evidence of aortic dissection or emergent cardiothoracic process noted. [DD]      ED Course User Index  [DD] Donell Espinoza MD                                           Recent Results (from the past 24 hours)   ECG 12 Lead Chest Pain    Collection Time: 07/01/25 12:27 PM   Result Value Ref Range    QT Interval 390 ms    QTC Interval 460 ms   High Sensitivity Troponin T    Collection Time: 07/01/25 12:35 PM    Specimen: Blood   Result Value Ref Range    HS Troponin T <6 <14 ng/L   Comprehensive  Metabolic Panel    Collection Time: 07/01/25 12:35 PM    Specimen: Blood   Result Value Ref Range    Glucose 90 65 - 99 mg/dL    BUN 11.2 6.0 - 20.0 mg/dL    Creatinine 0.48 (L) 0.57 - 1.00 mg/dL    Sodium 133 (L) 136 - 145 mmol/L    Potassium 3.7 3.5 - 5.2 mmol/L    Chloride 102 98 - 107 mmol/L    CO2 16.9 (L) 22.0 - 29.0 mmol/L    Calcium 9.7 8.6 - 10.5 mg/dL    Total Protein 7.9 6.0 - 8.5 g/dL    Albumin 4.3 3.5 - 5.2 g/dL    ALT (SGPT) 10 1 - 33 U/L    AST (SGOT) 18 1 - 32 U/L    Alkaline Phosphatase 79 39 - 117 U/L    Total Bilirubin 0.3 0.0 - 1.2 mg/dL    Globulin 3.6 gm/dL    A/G Ratio 1.2 g/dL    BUN/Creatinine Ratio 23.3 7.0 - 25.0    Anion Gap 14.1 5.0 - 15.0 mmol/L    eGFR 138.4 >60.0 mL/min/1.73   Lipase    Collection Time: 07/01/25 12:35 PM    Specimen: Blood   Result Value Ref Range    Lipase 21 13 - 60 U/L   BNP    Collection Time: 07/01/25 12:35 PM    Specimen: Blood   Result Value Ref Range    proBNP 145.0 0.0 - 450.0 pg/mL   Green Top (Gel)    Collection Time: 07/01/25 12:35 PM   Result Value Ref Range    Extra Tube Hold for add-ons.    Lavender Top    Collection Time: 07/01/25 12:35 PM   Result Value Ref Range    Extra Tube hold for add-on    Gold Top - SST    Collection Time: 07/01/25 12:35 PM   Result Value Ref Range    Extra Tube Hold for add-ons.    Gray Top    Collection Time: 07/01/25 12:35 PM   Result Value Ref Range    Extra Tube Hold for add-ons.    Light Blue Top    Collection Time: 07/01/25 12:35 PM   Result Value Ref Range    Extra Tube Hold for add-ons.    CBC Auto Differential    Collection Time: 07/01/25 12:35 PM    Specimen: Blood   Result Value Ref Range    WBC 10.57 3.40 - 10.80 10*3/mm3    RBC 4.77 3.77 - 5.28 10*6/mm3    Hemoglobin 13.5 12.0 - 15.9 g/dL    Hematocrit 40.6 34.0 - 46.6 %    MCV 85.1 79.0 - 97.0 fL    MCH 28.3 26.6 - 33.0 pg    MCHC 33.3 31.5 - 35.7 g/dL    RDW 12.4 12.3 - 15.4 %    RDW-SD 38.5 37.0 - 54.0 fl    MPV 10.8 6.0 - 12.0 fL    Platelets 340 140 - 450  10*3/mm3    Neutrophil % 54.8 42.7 - 76.0 %    Lymphocyte % 36.3 19.6 - 45.3 %    Monocyte % 6.9 5.0 - 12.0 %    Eosinophil % 1.2 0.3 - 6.2 %    Basophil % 0.6 0.0 - 1.5 %    Immature Grans % 0.2 0.0 - 0.5 %    Neutrophils, Absolute 5.79 1.70 - 7.00 10*3/mm3    Lymphocytes, Absolute 3.84 (H) 0.70 - 3.10 10*3/mm3    Monocytes, Absolute 0.73 0.10 - 0.90 10*3/mm3    Eosinophils, Absolute 0.13 0.00 - 0.40 10*3/mm3    Basophils, Absolute 0.06 0.00 - 0.20 10*3/mm3    Immature Grans, Absolute 0.02 0.00 - 0.05 10*3/mm3    nRBC 0.0 0.0 - 0.2 /100 WBC   hCG, Quantitative, Pregnancy    Collection Time: 07/01/25 12:35 PM    Specimen: Blood   Result Value Ref Range    HCG Quantitative <1.00 mIU/mL   Magnesium    Collection Time: 07/01/25 12:35 PM    Specimen: Blood   Result Value Ref Range    Magnesium 1.8 1.6 - 2.6 mg/dL   High Sensitivity Troponin T 1Hr    Collection Time: 07/01/25  1:50 PM    Specimen: Blood   Result Value Ref Range    HS Troponin T <6 <14 ng/L    Troponin T Numeric Delta       Note: In addition to lab results from this visit, the labs listed above may include labs taken at another facility or during a different encounter within the last 24 hours. Please correlate lab times with ED admission and discharge times for further clarification of the services performed during this visit.    CT Angiogram Chest   Final Result   Impression:   No acute findings in the chest.         Electronically Signed: Sai Segura MD     7/1/2025 3:22 PM EDT     Workstation ID: [id number]        Vitals:    07/01/25 1400 07/01/25 1500 07/01/25 1530 07/01/25 1600   BP: 114/68 107/66 108/65 105/57   Pulse: 77 66 61 56   Resp:  18  18   Temp:       TempSrc:       SpO2: 94% 95% 94% 93%   Weight:       Height:         Medications   aspirin chewable tablet 324 mg (324 mg Oral Given 7/1/25 1243)   Morphine sulfate (PF) injection 4 mg (4 mg Intravenous Given 7/1/25 1244)   ondansetron (ZOFRAN) injection 4 mg (4 mg Intravenous Given 7/1/25  1244)   iopamidol (ISOVUE-370) 76 % injection 100 mL (90 mL Intravenous Given 7/1/25 1433)     ECG/EMG Results (last 24 hours)       Procedure Component Value Units Date/Time    Telemetry Scan [[id number]] Resulted: 07/01/25 1229     Updated: 07/01/25 1249    ECG 12 Lead Chest Pain [[id number]] Collected: 07/01/25 1227     Updated: 07/01/25 1428     QT Interval 390 ms      QTC Interval 460 ms     Narrative:      Test Reason : Chest Pain  Blood Pressure :   */*   mmHG  Vent. Rate :  84 BPM     Atrial Rate :  84 BPM     P-R Int : 128 ms          QRS Dur :  86 ms      QT Int : 390 ms       P-R-T Axes : -12  72  -8 degrees    QTcB Int : 460 ms    Normal sinus rhythm  T wave abnormality, consider inferior ischemia  Abnormal ECG  No previous ECGs available  Confirmed by MD Espinoza Michael ([id number]) on 7/1/2025 2:28:37 PM    Referred By: chelsey           Confirmed By: Walter Espinoza MD          Telemetry Scan   Final Result      Telemetry Scan   Final Result      ECG 12 Lead Chest Pain   Final Result   Test Reason : Chest Pain   Blood Pressure :   */*   mmHG   Vent. Rate :  84 BPM     Atrial Rate :  84 BPM      P-R Int : 128 ms          QRS Dur :  86 ms       QT Int : 390 ms       P-R-T Axes : -12  72  -8 degrees     QTcB Int : 460 ms      Normal sinus rhythm   T wave abnormality, consider inferior ischemia   Abnormal ECG   No previous ECGs available   Confirmed by MD Espinoza Michael ([id number]) on 7/1/2025 2:28:37 PM      Referred By: chelsey           Confirmed By: Walter Espinoza MD                      Medical Decision Making  Problems Addressed:  Chest pain, unspecified type: complicated acute illness or injury  Hyponatremia: complicated acute illness or injury    Amount and/or Complexity of Data Reviewed  Labs: ordered.  Radiology: ordered.  ECG/medicine tests: ordered.    Risk  OTC drugs.  Prescription drug management.        Final diagnoses:   Chest pain, unspecified type   Hyponatremia       ED Disposition  ED Disposition       ED  Disposition   Discharge    Condition   Stable    Comment   --               [facility]  [address]  [address]  [facility] [address] [phone]  In 3 days      Alec Kapadia MD  [address] [facility] [address]  [place] [id number]  [phone]      As needed         Medication List      No changes were made to your prescriptions during this visit.            Donell Espinoza MD  07/01/25 2330

## 2025-07-09 ENCOUNTER — TELEPHONE (OUTPATIENT)
Dept: CARDIOLOGY | Facility: HOSPITAL | Age: 20
End: 2025-07-09

## 2025-07-09 NOTE — TELEPHONE ENCOUNTER
Patient was referred to the Chest Pain Clinic by the [facility]. Several attempts have been made to contact the patient with no success. Letter was  mailed to patient to contact the office to schedule.